# Patient Record
Sex: MALE | Race: WHITE | Employment: FULL TIME | URBAN - METROPOLITAN AREA
[De-identification: names, ages, dates, MRNs, and addresses within clinical notes are randomized per-mention and may not be internally consistent; named-entity substitution may affect disease eponyms.]

---

## 2017-11-14 ENCOUNTER — OFFICE VISIT (OUTPATIENT)
Dept: FAMILY MEDICINE CLINIC | Facility: CLINIC | Age: 27
End: 2017-11-14

## 2017-11-14 VITALS
DIASTOLIC BLOOD PRESSURE: 84 MMHG | SYSTOLIC BLOOD PRESSURE: 120 MMHG | RESPIRATION RATE: 20 BRPM | HEART RATE: 106 BPM | TEMPERATURE: 99 F | WEIGHT: 210 LBS | HEIGHT: 76 IN | OXYGEN SATURATION: 98 % | BODY MASS INDEX: 25.57 KG/M2

## 2017-11-14 DIAGNOSIS — J02.9 PHARYNGITIS, UNSPECIFIED ETIOLOGY: Primary | ICD-10-CM

## 2017-11-14 DIAGNOSIS — J40 BRONCHITIS: ICD-10-CM

## 2017-11-14 PROCEDURE — 99213 OFFICE O/P EST LOW 20 MIN: CPT | Performed by: NURSE PRACTITIONER

## 2017-11-14 PROCEDURE — 87880 STREP A ASSAY W/OPTIC: CPT | Performed by: NURSE PRACTITIONER

## 2017-11-14 RX ORDER — PREDNISONE 20 MG/1
TABLET ORAL
Qty: 15 TABLET | Refills: 0 | Status: SHIPPED | OUTPATIENT
Start: 2017-11-14 | End: 2018-06-28

## 2017-11-14 RX ORDER — ALBUTEROL SULFATE 90 UG/1
AEROSOL, METERED RESPIRATORY (INHALATION)
Qty: 1 INHALER | Refills: 0 | Status: SHIPPED | OUTPATIENT
Start: 2017-11-14 | End: 2018-06-28 | Stop reason: ALTCHOICE

## 2017-11-14 NOTE — PROGRESS NOTES
CHIEF COMPLAINT:   Patient presents with:  Sore Throat  Cough        HPI:   Gregg Loco is a 33year old male who presents for cough for  3  days. Cough started gradually and is described as dry and hacking. Patient + history of bronchitis.  Cough is n Nostrils patent, nasal mucosa pink and non-inflamed. Slight erythema of the throat. NECK: supple, non-tender. LUNGS: Normal respiratory rate. Normal effort.  + dry hacking cough with deep inhalation. Negative wheezing. No rales or crackles.  No dullness

## 2018-06-28 PROBLEM — F41.9 ANXIETY: Status: ACTIVE | Noted: 2018-06-28

## 2018-06-28 PROBLEM — E78.2 MIXED HYPERLIPIDEMIA: Status: ACTIVE | Noted: 2018-06-28

## 2018-06-28 PROBLEM — J40 BRONCHITIS: Status: RESOLVED | Noted: 2017-11-14 | Resolved: 2018-06-28

## 2019-01-05 ENCOUNTER — OFFICE VISIT (OUTPATIENT)
Dept: FAMILY MEDICINE CLINIC | Facility: CLINIC | Age: 29
End: 2019-01-05

## 2019-01-05 VITALS
RESPIRATION RATE: 16 BRPM | SYSTOLIC BLOOD PRESSURE: 153 MMHG | BODY MASS INDEX: 26.51 KG/M2 | DIASTOLIC BLOOD PRESSURE: 96 MMHG | TEMPERATURE: 99 F | OXYGEN SATURATION: 98 % | HEIGHT: 74.75 IN | HEART RATE: 76 BPM | WEIGHT: 211 LBS

## 2019-01-05 DIAGNOSIS — Z02.1 PHYSICAL EXAM, PRE-EMPLOYMENT: Primary | ICD-10-CM

## 2019-01-05 DIAGNOSIS — R03.0 ELEVATED BLOOD PRESSURE READING: ICD-10-CM

## 2019-01-05 PROCEDURE — 99395 PREV VISIT EST AGE 18-39: CPT | Performed by: NURSE PRACTITIONER

## 2019-01-05 NOTE — PATIENT INSTRUCTIONS
Re-check your blood pressure in the next 1-2 weeks  and follow up with your primary care provider if your blood pressure is still elevated. You may return to the walk-in clinic to have your blood pressure checked.

## 2019-01-05 NOTE — PROGRESS NOTES
Mehdi Sweet is a 33year old male  presents for an internship (social work). Pt is not going to participate in sports. Current Outpatient Medications:  BuPROPion HCl ER, XL, (WELLBUTRIN XL) 300 MG Oral Tablet 24 Hr Take 300 mg by mouth daily.  Dis chest tenderness or masses  LUNGS: clear to auscultation  CARDIO: RRR without murmur  GI: good BS's and no masses, HSM or tenderness  : deferred  MUSCULOSKELETAL: back is not tender and FROM of the back, no evidence of scoliosis  EXTREMITIES: no deformit

## 2019-07-18 PROBLEM — I10 ESSENTIAL HYPERTENSION, BENIGN: Status: ACTIVE | Noted: 2019-07-18

## 2019-07-19 PROCEDURE — 80307 DRUG TEST PRSMV CHEM ANLYZR: CPT | Performed by: FAMILY MEDICINE

## 2019-07-19 PROCEDURE — 80325 AMPHETAMINES 3OR 4: CPT | Performed by: FAMILY MEDICINE
